# Patient Record
Sex: FEMALE | Race: WHITE
[De-identification: names, ages, dates, MRNs, and addresses within clinical notes are randomized per-mention and may not be internally consistent; named-entity substitution may affect disease eponyms.]

---

## 2022-07-30 ENCOUNTER — TELEPHONE ENCOUNTER (OUTPATIENT)
Age: 67
End: 2022-07-30

## 2022-07-31 ENCOUNTER — TELEPHONE ENCOUNTER (OUTPATIENT)
Age: 67
End: 2022-07-31

## 2022-08-24 ENCOUNTER — P2P PATIENT RECORD (OUTPATIENT)
Age: 67
End: 2022-08-24

## 2022-08-26 ENCOUNTER — WEB ENCOUNTER (OUTPATIENT)
Dept: URBAN - METROPOLITAN AREA CLINIC 60 | Facility: CLINIC | Age: 67
End: 2022-08-26

## 2022-08-26 ENCOUNTER — OFFICE VISIT (OUTPATIENT)
Dept: URBAN - METROPOLITAN AREA CLINIC 60 | Facility: CLINIC | Age: 67
End: 2022-08-26

## 2022-08-26 RX ORDER — METRONIDAZOLE 500 MG/1
TAKE 1 TABLET BY MOUTH EVERY 8 HOURS FOR 7 DAYS TABLET ORAL
Qty: 21 EACH | Refills: 0 | Status: ACTIVE | COMMUNITY

## 2022-08-26 RX ORDER — DICLOFENAC SODIUM 10 MG/G
GEL TOPICAL
Qty: 300 GRAM | Status: ACTIVE | COMMUNITY

## 2022-08-26 RX ORDER — METAXALONE 800 MG/1
TAKE 1 TABLET BY MOUTH TWICE A DAY TABLET ORAL
Qty: 60 EACH | Refills: 0 | Status: ACTIVE | COMMUNITY

## 2022-08-26 RX ORDER — COLESEVELAM HYDROCHLORIDE 3.75 G/1
TAKE 1 PACKET EVERY DAY BY ORAL ROUTE POWDER, FOR SUSPENSION ORAL
Qty: 30 EACH | Refills: 0 | Status: ACTIVE | COMMUNITY

## 2022-08-30 ENCOUNTER — OFFICE VISIT (OUTPATIENT)
Dept: URBAN - METROPOLITAN AREA CLINIC 63 | Facility: CLINIC | Age: 67
End: 2022-08-30

## 2022-08-31 ENCOUNTER — OFFICE VISIT (OUTPATIENT)
Dept: URBAN - METROPOLITAN AREA CLINIC 63 | Facility: CLINIC | Age: 67
End: 2022-08-31

## 2022-09-14 ENCOUNTER — DASHBOARD ENCOUNTERS (OUTPATIENT)
Age: 67
End: 2022-09-14

## 2022-09-14 ENCOUNTER — WEB ENCOUNTER (OUTPATIENT)
Dept: URBAN - METROPOLITAN AREA CLINIC 63 | Facility: CLINIC | Age: 67
End: 2022-09-14

## 2022-09-14 ENCOUNTER — OFFICE VISIT (OUTPATIENT)
Dept: URBAN - METROPOLITAN AREA CLINIC 63 | Facility: CLINIC | Age: 67
End: 2022-09-14
Payer: COMMERCIAL

## 2022-09-14 ENCOUNTER — LAB OUTSIDE AN ENCOUNTER (OUTPATIENT)
Dept: URBAN - METROPOLITAN AREA CLINIC 63 | Facility: CLINIC | Age: 67
End: 2022-09-14

## 2022-09-14 VITALS
HEART RATE: 56 BPM | SYSTOLIC BLOOD PRESSURE: 140 MMHG | HEIGHT: 66 IN | BODY MASS INDEX: 25.23 KG/M2 | DIASTOLIC BLOOD PRESSURE: 80 MMHG | TEMPERATURE: 97.4 F | OXYGEN SATURATION: 97 % | WEIGHT: 157 LBS

## 2022-09-14 DIAGNOSIS — R19.7 DIARRHEA, UNSPECIFIED TYPE: ICD-10-CM

## 2022-09-14 PROCEDURE — 99204 OFFICE O/P NEW MOD 45 MIN: CPT | Performed by: INTERNAL MEDICINE

## 2022-09-14 RX ORDER — COLESEVELAM HYDROCHLORIDE 3.75 G/1
TAKE 1 PACKET EVERY DAY BY ORAL ROUTE POWDER, FOR SUSPENSION ORAL
Qty: 30 EACH | Refills: 0 | Status: ACTIVE | COMMUNITY

## 2022-09-14 RX ORDER — METAXALONE 800 MG/1
TAKE 1 TABLET BY MOUTH TWICE A DAY TABLET ORAL
Qty: 60 EACH | Refills: 0 | Status: ACTIVE | COMMUNITY

## 2022-09-14 RX ORDER — DICYCLOMINE HYDROCHLORIDE 20 MG/1
1 TABLET TABLET ORAL
Refills: 1 | OUTPATIENT
Start: 2022-09-14 | End: 2022-11-12

## 2022-09-14 RX ORDER — DICLOFENAC SODIUM 10 MG/G
GEL TOPICAL
Qty: 300 GRAM | Status: ACTIVE | COMMUNITY

## 2022-09-14 NOTE — HPI-TODAY'S VISIT:
Margarette is a 67-year-old female that presents today as a new patient.  Referred to the office for diarrhea.  She has been having diarrhea for 4 months.  She says that this will occasionally wake her up at night.  The diarrhea is mostly in the morning when she wakes up.  She will have several episodes of loose stool.  Once she is cleaned out, she does not have any further episodes throughout the day.  She complains of some abdominal discomfort and cramping.  Denies blood in the stool.  She had stool studies ordered by her PCP which were unremarkable.  Labs were unremarkable.  She had a colonoscopy between 9 and 10 years ago.  She says that her colonoscopy was normal.  She currently is not taking any antidiarrhea  medication.  She is using a probiotic.

## 2022-09-15 ENCOUNTER — TELEPHONE ENCOUNTER (OUTPATIENT)
Dept: URBAN - METROPOLITAN AREA CLINIC 63 | Facility: CLINIC | Age: 67
End: 2022-09-15

## 2022-09-15 RX ORDER — DICYCLOMINE HYDROCHLORIDE 20 MG/1
1 TABLET TABLET ORAL
Qty: 30 | Refills: 1

## 2022-09-19 ENCOUNTER — ERX REFILL RESPONSE (OUTPATIENT)
Dept: URBAN - METROPOLITAN AREA CLINIC 63 | Facility: CLINIC | Age: 67
End: 2022-09-19

## 2022-09-19 RX ORDER — DICYCLOMINE HYDROCHLORIDE 20 MG/1
TAKE 1 TABLET ORALLY THREE TIMES A DAY AS NEEDED FOR PAIN 30 DAY(S) TABLET ORAL
Qty: 30 TABLET | Refills: 1 | OUTPATIENT

## 2022-09-19 RX ORDER — DICYCLOMINE HYDROCHLORIDE 20 MG/1
1 TABLET TABLET ORAL
Qty: 30 | Refills: 1 | OUTPATIENT

## 2022-09-22 LAB
C-REACTIVE PROTEIN, QUANT: 2
IMMUNOGLOBULIN A: 219
INTERPRETATION: (no result)
TISSUE TRANSGLUTAMINASE AB, IGA: <1
TSH W/REFLEX TO FT4: 1.1

## 2022-09-26 ENCOUNTER — TELEPHONE ENCOUNTER (OUTPATIENT)
Dept: URBAN - METROPOLITAN AREA CLINIC 63 | Facility: CLINIC | Age: 67
End: 2022-09-26

## 2022-09-27 ENCOUNTER — TELEPHONE ENCOUNTER (OUTPATIENT)
Dept: URBAN - METROPOLITAN AREA SURGERY CENTER 4 | Facility: SURGERY CENTER | Age: 67
End: 2022-09-27

## 2022-10-01 LAB
CALPROTECTIN, FECAL: 98
CLOSTRIDIUM DIFFICILE TOXINB,QL REAL TIME PCR: NOT DETECTED
LACTOFERRIN, FECAL, QUANT.: <6.25
PANCREATIC ELASTASE, FECAL: 348

## 2022-10-03 ENCOUNTER — OFFICE VISIT (OUTPATIENT)
Dept: URBAN - METROPOLITAN AREA CLINIC 63 | Facility: CLINIC | Age: 67
End: 2022-10-03

## 2023-03-14 ENCOUNTER — LAB OUTSIDE AN ENCOUNTER (OUTPATIENT)
Dept: URBAN - METROPOLITAN AREA CLINIC 63 | Facility: CLINIC | Age: 68
End: 2023-03-14

## 2023-03-14 ENCOUNTER — TELEPHONE ENCOUNTER (OUTPATIENT)
Dept: URBAN - METROPOLITAN AREA CLINIC 63 | Facility: CLINIC | Age: 68
End: 2023-03-14

## 2023-03-14 RX ORDER — DICYCLOMINE HYDROCHLORIDE 20 MG/1
TAKE 1 TABLET ORALLY THREE TIMES A DAY AS NEEDED FOR PAIN 30 DAY(S) TABLET ORAL
Qty: 30 TABLET | Refills: 1

## 2023-03-20 ENCOUNTER — TELEPHONE ENCOUNTER (OUTPATIENT)
Dept: URBAN - METROPOLITAN AREA CLINIC 63 | Facility: CLINIC | Age: 68
End: 2023-03-20

## 2023-03-31 ENCOUNTER — CLAIMS CREATED FROM THE CLAIM WINDOW (OUTPATIENT)
Dept: URBAN - METROPOLITAN AREA SURGERY CENTER 4 | Facility: SURGERY CENTER | Age: 68
End: 2023-03-31
Payer: COMMERCIAL

## 2023-03-31 ENCOUNTER — CLAIMS CREATED FROM THE CLAIM WINDOW (OUTPATIENT)
Dept: URBAN - METROPOLITAN AREA CLINIC 4 | Facility: CLINIC | Age: 68
End: 2023-03-31
Payer: COMMERCIAL

## 2023-03-31 DIAGNOSIS — K64.0 GRADE I HEMORRHOIDS: ICD-10-CM

## 2023-03-31 DIAGNOSIS — K57.30 DIVERTCULOSIS OF LG INT W/O PERFORATION OR ABSCESS W/O BLEEDING: ICD-10-CM

## 2023-03-31 DIAGNOSIS — D12.0 BENIGN NEOPLASM OF CECUM: ICD-10-CM

## 2023-03-31 DIAGNOSIS — D12.0 POLYP OF CECUM: ICD-10-CM

## 2023-03-31 DIAGNOSIS — K63.89 OTHER SPECIFIED DISEASES OF INTESTINE: ICD-10-CM

## 2023-03-31 DIAGNOSIS — R19.7 DIARRHEA, UNSPECIFIED TYPE: ICD-10-CM

## 2023-03-31 PROCEDURE — 45380 COLONOSCOPY AND BIOPSY: CPT | Performed by: INTERNAL MEDICINE

## 2023-03-31 PROCEDURE — 88342 IMHCHEM/IMCYTCHM 1ST ANTB: CPT | Performed by: PATHOLOGY

## 2023-03-31 PROCEDURE — 88305 TISSUE EXAM BY PATHOLOGIST: CPT | Performed by: PATHOLOGY

## 2023-03-31 PROCEDURE — 88313 SPECIAL STAINS GROUP 2: CPT | Performed by: PATHOLOGY

## 2023-03-31 RX ORDER — DICLOFENAC SODIUM 10 MG/G
GEL TOPICAL
Qty: 300 GRAM | Status: ACTIVE | COMMUNITY

## 2023-03-31 RX ORDER — METAXALONE 800 MG/1
TAKE 1 TABLET BY MOUTH TWICE A DAY TABLET ORAL
Qty: 60 EACH | Refills: 0 | Status: ACTIVE | COMMUNITY

## 2023-03-31 RX ORDER — COLESEVELAM HYDROCHLORIDE 3.75 G/1
TAKE 1 PACKET EVERY DAY BY ORAL ROUTE POWDER, FOR SUSPENSION ORAL
Qty: 30 EACH | Refills: 0 | Status: ACTIVE | COMMUNITY

## 2023-03-31 RX ORDER — DICYCLOMINE HYDROCHLORIDE 20 MG/1
TAKE 1 TABLET ORALLY THREE TIMES A DAY AS NEEDED FOR PAIN 30 DAY(S) TABLET ORAL
Qty: 30 TABLET | Refills: 1 | Status: ACTIVE | COMMUNITY

## 2023-04-10 ENCOUNTER — WEB ENCOUNTER (OUTPATIENT)
Dept: URBAN - METROPOLITAN AREA CLINIC 60 | Facility: CLINIC | Age: 68
End: 2023-04-10

## 2023-04-10 RX ORDER — DICYCLOMINE HYDROCHLORIDE 20 MG/1
TAKE 1 TABLET ORALLY THREE TIMES A DAY AS NEEDED FOR PAIN 30 DAY(S) TABLET ORAL
Qty: 30 TABLET | Refills: 1

## 2025-05-30 ENCOUNTER — OFFICE VISIT (OUTPATIENT)
Dept: URBAN - METROPOLITAN AREA CLINIC 60 | Facility: CLINIC | Age: 70
End: 2025-05-30
Payer: COMMERCIAL

## 2025-05-30 DIAGNOSIS — R10.13 EPIGASTRIC PAIN: ICD-10-CM

## 2025-05-30 DIAGNOSIS — K52.9 CHRONIC DIARRHEA: ICD-10-CM

## 2025-05-30 DIAGNOSIS — R10.13 DYSPEPSIA: ICD-10-CM

## 2025-05-30 PROCEDURE — 99214 OFFICE O/P EST MOD 30 MIN: CPT

## 2025-05-30 RX ORDER — DICLOFENAC SODIUM 10 MG/G
GEL TOPICAL
Qty: 300 GRAM | Status: ACTIVE | COMMUNITY

## 2025-05-30 RX ORDER — METAXALONE 800 MG/1
TAKE 1 TABLET BY MOUTH TWICE A DAY TABLET ORAL
Qty: 60 EACH | Refills: 0 | Status: ACTIVE | COMMUNITY

## 2025-05-30 RX ORDER — DICYCLOMINE HYDROCHLORIDE 20 MG/1
TAKE 1 TABLET ORALLY THREE TIMES A DAY AS NEEDED FOR PAIN 30 DAY(S) TABLET ORAL
Qty: 30 TABLET | Refills: 1 | Status: ACTIVE | COMMUNITY

## 2025-05-30 RX ORDER — COLESEVELAM HYDROCHLORIDE 3.75 G/1
TAKE 1 PACKET EVERY DAY BY ORAL ROUTE POWDER, FOR SUSPENSION ORAL
Qty: 30 EACH | Refills: 0 | Status: ACTIVE | COMMUNITY

## 2025-05-30 RX ORDER — OMEPRAZOLE 20 MG/1
1 CAPSULE 1/2 TO 1 HOUR BEFORE MORNING MEAL CAPSULE, DELAYED RELEASE ORAL ONCE A DAY
Qty: 90 | Refills: 3 | OUTPATIENT
Start: 2025-05-30

## 2025-05-30 NOTE — HPI-TODAY'S VISIT:
Patient is a 70-year-old female with a past history of chronic diarrhea who presents today with complaints of epigastric pain, lower abdominal pain, abdominal bloating and watery diarrhea.  Patient states that the symptoms have been ongoing for years and she was seen here for a GI workup that was essentially unremarkable.  Since then she has seen both gynecology and urology again with unremarkable workups however her symptoms continue to persist.  She states that she has tried Allyssa-Kennedy and Gas-X which does help with her epigastric pain and abdominal bloating.  She has not tried any OTC PPIs or H2 blockers.  She also takes dicyclomine as needed which helps with her lower abdominal pain.  Patient did have stool studies done in 2022 which showed low normal pancreatic elastase however she has not tried any pancreatic enzyme replacement therapy. . Colonoscopy 3/31/2023 with Dr. Spicer - Nonbleeding internal hemorrhoids - Diverticulosis in sigmoid colon - One 4 mm polyp in cecum - Normal mucosa in entire examined colon, biopsied - Pathology: Cecum polyp-tubular adenoma; random colon biopsy-no significant abnormality - Repeat colonoscopy in 5 years . Stool studies 9/23/2022 - Pancreatic elastase within normal limits 348 - Fecal calprotectin within normal limits - Fecal lactoferrin within normal limits - C. difficile not detected . Labs 9/16/2022 - TSH within normal limits - CRP within normal limits - Celiac disease panel negative

## 2025-08-28 ENCOUNTER — CLAIMS CREATED FROM THE CLAIM WINDOW (OUTPATIENT)
Dept: URBAN - METROPOLITAN AREA SURGERY CENTER 4 | Facility: SURGERY CENTER | Age: 70
End: 2025-08-28
Payer: COMMERCIAL

## 2025-08-28 DIAGNOSIS — K31.89 OTHER DISEASES OF STOMACH AND DUODENUM: ICD-10-CM

## 2025-08-28 DIAGNOSIS — R10.13 DYSPEPSIA: ICD-10-CM

## 2025-08-28 DIAGNOSIS — K44.9 DIAPHRAGMATIC HERNIA WITHOUT OBSTRUCTION OR GANGRENE: ICD-10-CM

## 2025-08-28 PROCEDURE — 43239 EGD BIOPSY SINGLE/MULTIPLE: CPT | Performed by: INTERNAL MEDICINE

## 2025-08-28 RX ORDER — DICLOFENAC SODIUM 10 MG/G
GEL TOPICAL
Qty: 300 GRAM | Status: ACTIVE | COMMUNITY

## 2025-08-28 RX ORDER — DICYCLOMINE HYDROCHLORIDE 20 MG/1
TAKE 1 TABLET ORALLY THREE TIMES A DAY AS NEEDED FOR PAIN 30 DAY(S) TABLET ORAL
Qty: 30 TABLET | Refills: 1 | Status: ACTIVE | COMMUNITY

## 2025-08-28 RX ORDER — COLESEVELAM HYDROCHLORIDE 3.75 G/1
TAKE 1 PACKET EVERY DAY BY ORAL ROUTE POWDER, FOR SUSPENSION ORAL
Qty: 30 EACH | Refills: 0 | Status: ACTIVE | COMMUNITY

## 2025-08-28 RX ORDER — METAXALONE 800 MG/1
TAKE 1 TABLET BY MOUTH TWICE A DAY TABLET ORAL
Qty: 60 EACH | Refills: 0 | Status: ACTIVE | COMMUNITY

## 2025-08-28 RX ORDER — OMEPRAZOLE 20 MG/1
1 CAPSULE 1/2 TO 1 HOUR BEFORE MORNING MEAL CAPSULE, DELAYED RELEASE ORAL ONCE A DAY
Qty: 90 | Refills: 3 | Status: ACTIVE | COMMUNITY
Start: 2025-05-30